# Patient Record
Sex: MALE | Race: WHITE | NOT HISPANIC OR LATINO | Employment: OTHER | URBAN - NONMETROPOLITAN AREA
[De-identification: names, ages, dates, MRNs, and addresses within clinical notes are randomized per-mention and may not be internally consistent; named-entity substitution may affect disease eponyms.]

---

## 2024-02-17 ENCOUNTER — APPOINTMENT (OUTPATIENT)
Dept: RADIOLOGY | Facility: HOSPITAL | Age: 74
End: 2024-02-17
Payer: MEDICARE

## 2024-02-17 ENCOUNTER — APPOINTMENT (OUTPATIENT)
Dept: CARDIOLOGY | Facility: HOSPITAL | Age: 74
End: 2024-02-17
Payer: MEDICARE

## 2024-02-17 ENCOUNTER — HOSPITAL ENCOUNTER (EMERGENCY)
Facility: HOSPITAL | Age: 74
Discharge: HOME | End: 2024-02-17
Payer: MEDICARE

## 2024-02-17 VITALS
OXYGEN SATURATION: 95 % | SYSTOLIC BLOOD PRESSURE: 128 MMHG | DIASTOLIC BLOOD PRESSURE: 94 MMHG | TEMPERATURE: 98.6 F | WEIGHT: 250 LBS | HEIGHT: 69 IN | HEART RATE: 92 BPM | BODY MASS INDEX: 37.03 KG/M2 | RESPIRATION RATE: 20 BRPM

## 2024-02-17 DIAGNOSIS — R06.02 SHORTNESS OF BREATH: Primary | ICD-10-CM

## 2024-02-17 LAB
ALBUMIN SERPL BCP-MCNC: 4 G/DL (ref 3.4–5)
ALP SERPL-CCNC: 83 U/L (ref 33–136)
ALT SERPL W P-5'-P-CCNC: 12 U/L (ref 10–52)
ANION GAP SERPL CALC-SCNC: 21 MMOL/L (ref 10–20)
AST SERPL W P-5'-P-CCNC: 11 U/L (ref 9–39)
BASOPHILS # BLD AUTO: 0.04 X10*3/UL (ref 0–0.1)
BASOPHILS NFR BLD AUTO: 0.2 %
BILIRUB SERPL-MCNC: 0.8 MG/DL (ref 0–1.2)
BNP SERPL-MCNC: 40 PG/ML (ref 0–99)
BUN SERPL-MCNC: 66 MG/DL (ref 6–23)
CALCIUM SERPL-MCNC: 10.2 MG/DL (ref 8.6–10.3)
CARDIAC TROPONIN I PNL SERPL HS: 14 NG/L (ref 0–20)
CHLORIDE SERPL-SCNC: 107 MMOL/L (ref 98–107)
CO2 SERPL-SCNC: 17 MMOL/L (ref 21–32)
CREAT SERPL-MCNC: 1.66 MG/DL (ref 0.5–1.3)
EGFRCR SERPLBLD CKD-EPI 2021: 43 ML/MIN/1.73M*2
EOSINOPHIL # BLD AUTO: 0 X10*3/UL (ref 0–0.4)
EOSINOPHIL NFR BLD AUTO: 0 %
ERYTHROCYTE [DISTWIDTH] IN BLOOD BY AUTOMATED COUNT: 13.8 % (ref 11.5–14.5)
FLUAV RNA RESP QL NAA+PROBE: NOT DETECTED
FLUBV RNA RESP QL NAA+PROBE: NOT DETECTED
GLUCOSE SERPL-MCNC: 170 MG/DL (ref 74–99)
HCT VFR BLD AUTO: 35.5 % (ref 41–52)
HGB BLD-MCNC: 11.8 G/DL (ref 13.5–17.5)
HOLD SPECIMEN: NORMAL
IMM GRANULOCYTES # BLD AUTO: 0.17 X10*3/UL (ref 0–0.5)
IMM GRANULOCYTES NFR BLD AUTO: 1 % (ref 0–0.9)
LACTATE SERPL-SCNC: 3 MMOL/L (ref 0.4–2)
LACTATE SERPL-SCNC: 8.7 MMOL/L (ref 0.4–2)
LYMPHOCYTES # BLD AUTO: 0.68 X10*3/UL (ref 0.8–3)
LYMPHOCYTES NFR BLD AUTO: 4 %
MAGNESIUM SERPL-MCNC: 2.13 MG/DL (ref 1.6–2.4)
MCH RBC QN AUTO: 32.8 PG (ref 26–34)
MCHC RBC AUTO-ENTMCNC: 33.2 G/DL (ref 32–36)
MCV RBC AUTO: 99 FL (ref 80–100)
MONOCYTES # BLD AUTO: 0.11 X10*3/UL (ref 0.05–0.8)
MONOCYTES NFR BLD AUTO: 0.6 %
NEUTROPHILS # BLD AUTO: 16.06 X10*3/UL (ref 1.6–5.5)
NEUTROPHILS NFR BLD AUTO: 94.2 %
NRBC BLD-RTO: 0 /100 WBCS (ref 0–0)
PLATELET # BLD AUTO: 346 X10*3/UL (ref 150–450)
POTASSIUM SERPL-SCNC: 4.4 MMOL/L (ref 3.5–5.3)
PROT SERPL-MCNC: 7 G/DL (ref 6.4–8.2)
RBC # BLD AUTO: 3.6 X10*6/UL (ref 4.5–5.9)
RBC MORPH BLD: NORMAL
SARS-COV-2 RNA RESP QL NAA+PROBE: NOT DETECTED
SODIUM SERPL-SCNC: 141 MMOL/L (ref 136–145)
WBC # BLD AUTO: 17.1 X10*3/UL (ref 4.4–11.3)

## 2024-02-17 PROCEDURE — 74177 CT ABD & PELVIS W/CONTRAST: CPT

## 2024-02-17 PROCEDURE — 36415 COLL VENOUS BLD VENIPUNCTURE: CPT | Performed by: HEALTH CARE PROVIDER

## 2024-02-17 PROCEDURE — 71275 CT ANGIOGRAPHY CHEST: CPT | Mod: FOREIGN READ | Performed by: RADIOLOGY

## 2024-02-17 PROCEDURE — 83880 ASSAY OF NATRIURETIC PEPTIDE: CPT | Performed by: HEALTH CARE PROVIDER

## 2024-02-17 PROCEDURE — 74177 CT ABD & PELVIS W/CONTRAST: CPT | Mod: FOREIGN READ | Performed by: RADIOLOGY

## 2024-02-17 PROCEDURE — 80053 COMPREHEN METABOLIC PANEL: CPT | Performed by: HEALTH CARE PROVIDER

## 2024-02-17 PROCEDURE — 2550000001 HC RX 255 CONTRASTS: Mod: SE | Performed by: HEALTH CARE PROVIDER

## 2024-02-17 PROCEDURE — 84484 ASSAY OF TROPONIN QUANT: CPT | Performed by: HEALTH CARE PROVIDER

## 2024-02-17 PROCEDURE — 96360 HYDRATION IV INFUSION INIT: CPT

## 2024-02-17 PROCEDURE — 2500000004 HC RX 250 GENERAL PHARMACY W/ HCPCS (ALT 636 FOR OP/ED): Mod: SE | Performed by: HEALTH CARE PROVIDER

## 2024-02-17 PROCEDURE — 83735 ASSAY OF MAGNESIUM: CPT | Performed by: HEALTH CARE PROVIDER

## 2024-02-17 PROCEDURE — 93005 ELECTROCARDIOGRAM TRACING: CPT

## 2024-02-17 PROCEDURE — 85025 COMPLETE CBC W/AUTO DIFF WBC: CPT | Performed by: HEALTH CARE PROVIDER

## 2024-02-17 PROCEDURE — 71275 CT ANGIOGRAPHY CHEST: CPT

## 2024-02-17 PROCEDURE — 83605 ASSAY OF LACTIC ACID: CPT | Performed by: HEALTH CARE PROVIDER

## 2024-02-17 PROCEDURE — 99285 EMERGENCY DEPT VISIT HI MDM: CPT | Mod: 25

## 2024-02-17 PROCEDURE — 87636 SARSCOV2 & INF A&B AMP PRB: CPT | Performed by: HEALTH CARE PROVIDER

## 2024-02-17 RX ADMIN — IOHEXOL 75 ML: 350 INJECTION, SOLUTION INTRAVENOUS at 18:12

## 2024-02-17 RX ADMIN — SODIUM CHLORIDE 1000 ML: 9 INJECTION, SOLUTION INTRAVENOUS at 19:20

## 2024-02-17 ASSESSMENT — COLUMBIA-SUICIDE SEVERITY RATING SCALE - C-SSRS
6. HAVE YOU EVER DONE ANYTHING, STARTED TO DO ANYTHING, OR PREPARED TO DO ANYTHING TO END YOUR LIFE?: NO
2. HAVE YOU ACTUALLY HAD ANY THOUGHTS OF KILLING YOURSELF?: NO
1. IN THE PAST MONTH, HAVE YOU WISHED YOU WERE DEAD OR WISHED YOU COULD GO TO SLEEP AND NOT WAKE UP?: NO

## 2024-02-17 ASSESSMENT — PAIN - FUNCTIONAL ASSESSMENT: PAIN_FUNCTIONAL_ASSESSMENT: 0-10

## 2024-02-17 ASSESSMENT — PAIN SCALES - GENERAL: PAINLEVEL_OUTOF10: 0 - NO PAIN

## 2024-02-17 NOTE — ED PROVIDER NOTES
HPI   Chief Complaint   Patient presents with    Shortness of Breath     SOB when walking        CC: Shortness of breath  HPI:   This is a 73-year-old male who is brought to ED via EMS complaining of shortness of breath patient is not hypoxic nor is he oxygen dependent at home EMS reported vital signs, pulse ox are normal, was actually seen at [facility] emergency room earlier today discharged home, he has a history of A-fib chronic, on Eliquis to, BPH, hypertension, patient states at rest he does not feel too short of breath however notes when he is moving around he becomes more short of breath.  He denies any recent fever, chills denies any current tobacco use, he denies having any chest pain no headache, dizziness or cervical neck tenderness.    Additional Limitations to History:  External Records Reviewed: I reviewed recent and relevant outside records including   History Obtained From:     Past Medical History: Per HPI  Medications: Reviewed in EMR and with patient  Allergies:  Reviewed in EMR  Past Surgical History:   Social History:     ------------------------------------------------------------------------------------------------------  Physical Exam:  --Vital signs reviewed in nursing triage note, EMR flow sheets, and at patient's bedside  GEN:  A&Ox3, no acute distress, appears comfortable.  Conversational and appropriate.  No confusion or gross mental status changes.  EYES: EOMI, non-injected sclera.  ENT: Moist mucous membranes, no apparent injuries or lesions.   CARDIO: Normal rate and regular rhythm. No murmurs, rubs, or gallops.  2+ equal pulses of the distal extremities.   PULM: Clear to auscultation bilaterally. No rales, rhonchi, or wheezes. Good symmetric chest expansion.  GI: Soft, non-tender, non-distended. No rebound tenderness or guarding.  SKIN: Warm and dry, no rashes or lesions.  MSK: ROM intact the extremities without contractures.   EXT: No peripheral edema,  contusions, or wounds.   NEURO: Cranial nerves II-XII grossly intact. Sensation to light touch intact and equal bilaterally in upper and lower extremities.  Symmetric 5/5 strength in upper and lower extremities.  PSYCH: Appropriate mood and behavior, converses and responds appropriately during exam.  -------------------------------------------------------------------------------------------------------        Differential Diagnoses Considered:   Chronic Medical Conditions Significantly Affecting Care:   Diagnostic testing considered: [PERC, D-Dimer, PECARN, etc.]    - EKG interpreted by myself atrial fibrillation ventricular rate 83 normal QRS duration no prolonged QT/QTc no obvious ST elevation/depression or T wave version comparable to prior ECGs no ischemic changes noted  - I independently interpreted: [CXR, CT, POCUS, etc. including your interpretation]  - Labs notable for     Escalation of Care: Appropriate for   Social Determinants of Health Significantly Affecting Care: [Homelessness, lacking transportation, uninsured, unable to afford medications]  Prescription Drug Consideration: [Antibiotics, antivirals, pain medications, etc.]  Discussion of Management with Other Providers:  I discussed the patient/results with: [admitting team, consultant, radiologist, social work, EPAT, case management, PT/OT, RT, PCP, etc.]      Ming Cerda PA-C                          [place] Coma Scale Score: 15                     Patient History   Past Medical History:   Diagnosis Date    Acute poliomyelitis, unspecified 09/26/2018    Polio    Personal history of other diseases of the circulatory system 09/26/2018    History of hypertension     Past Surgical History:   Procedure Laterality Date    HIP SURGERY  09/26/2018    Hip Surgery Right    OTHER SURGICAL HISTORY  09/26/2018    Hip Surgery Left     No family history on file.  Social History     Tobacco Use    Smoking status: Never    Smokeless tobacco: Never   Substance Use  Topics    Alcohol use: Not on file    Drug use: Not on file       Physical Exam   ED Triage Vitals [02/17/24 1557]   Temperature Heart Rate Respirations BP   36.7 °C (98.1 °F) 91 20 (!) 144/92      Pulse Ox Temp src Heart Rate Source Patient Position   95 % -- -- --      BP Location FiO2 (%)     -- --       Physical Exam    ED Course & MDM   ED Course as of 02/21/24 1338   Sat Feb 17, 2024   1704 Lactate(!!): 8.7  Redrawing lactate, do not suspect sepsis, leukocytosis likely from multiple DuoNebs patient recently received [KM]      ED Course User Index  [KM] Ming Cerda PA-C         Diagnoses as of 02/21/24 1338   Shortness of breath       Medical Decision Making  73-year-old male complaining of shortness of breath with exertion, patient had extensive workup in the ED including CT chest and abdomen no evidence of pulmonary emboli or acute coronary event patient has chronic A-fib and is anticoagulated initial lactate was hemolyzed repeat is 3.0 patient ambulates without difficulty or evidence of hypoxia he is not tachypneic negative COVID negative influenza leukocytosis likely secondary to steroids given to patient.  Patient is not requiring oxygen supplementation he is nontoxic-appearing well-hydrated afebrile in no acute distress I advised patient to follow-up with pulmonology outpatient for further evaluation, cardiology for further workup.        Procedure  Procedures     Ming Cerda PA-C  02/17/24 1807       Ming Cerda PA-C  02/21/24 1342

## 2024-02-18 LAB
ATRIAL RATE: 79 BPM
Q ONSET: 212 MS
QRS COUNT: 13 BEATS
QRS DURATION: 90 MS
QT INTERVAL: 376 MS
QTC CALCULATION(BAZETT): 441 MS
QTC FREDERICIA: 419 MS
R AXIS: 5 DEGREES
T AXIS: -77 DEGREES
T OFFSET: 400 MS
VENTRICULAR RATE: 83 BPM